# Patient Record
Sex: MALE
[De-identification: names, ages, dates, MRNs, and addresses within clinical notes are randomized per-mention and may not be internally consistent; named-entity substitution may affect disease eponyms.]

---

## 2019-05-09 PROBLEM — Z00.00 ENCOUNTER FOR PREVENTIVE HEALTH EXAMINATION: Status: ACTIVE | Noted: 2019-05-09

## 2019-06-10 ENCOUNTER — APPOINTMENT (OUTPATIENT)
Dept: NEUROLOGY | Facility: CLINIC | Age: 34
End: 2019-06-10
Payer: MEDICAID

## 2019-06-10 VITALS
WEIGHT: 223 LBS | SYSTOLIC BLOOD PRESSURE: 128 MMHG | BODY MASS INDEX: 29.55 KG/M2 | DIASTOLIC BLOOD PRESSURE: 80 MMHG | HEART RATE: 60 BPM | HEIGHT: 73 IN

## 2019-06-10 PROCEDURE — 99205 OFFICE O/P NEW HI 60 MIN: CPT

## 2019-06-10 NOTE — HISTORY OF PRESENT ILLNESS
[FreeTextEntry1] : This is a 33-year-old left-handed male who presents with chief complaints of tremors\par \par The patient states that since November 2018 he has noticed tremors in his right index finger and now he feels that it has progressed to involve both hands and his torso. This is more on the right upper extremity than the left. while sleeping he feels that there is some tremor in his body. He does endorse having anxiety. He denies any family history of tremor. Does not take coffee. Has occasional alcohol which does not change the tremor.\par \par He denies any changes in his handwriting the tremor does not interfere with any daily activities. He denies any slowness. No change in his walking or balance. His sense of smell is good. He denies constipation or urinary issues. His memory is not so good and sometimes he is forgetful but again does not interfere with any daily activities. He describes his mood as being mostly and she is sometimes depressed. He sometimes feels muscle twitching at different parts of his body. He\par \par He had a recent MRI of the brain done in December 2018 at [facility] which he states was  normal other than a cyst behind his eye. He also had MRI of the cervical and lumbar spine.\par \par He has tried Xanax , propranolol and trazodone, none of them helped and have been discontinued.\par \par Past medical history: Anxiety\par Family history unremarkable\par \par Review of systems: A complete review of systems is performed and is negative except as listed in history of present illness

## 2019-06-10 NOTE — PHYSICAL EXAM
[General Appearance - In No Acute Distress] : in no acute distress [General Appearance - Alert] : alert [Affect] : the affect was normal [Oriented To Time, Place, And Person] : oriented to person, place, and time [Impaired Insight] : insight and judgment were intact [Person] : oriented to person [Time] : oriented to time [Place] : oriented to place [Concentration Intact] : normal concentrating ability [Naming Objects] : no difficulty naming common objects [Visual Intact] : visual attention was ~T not ~L decreased [Writing A Sentence] : no difficulty writing a sentence [Repeating Phrases] : no difficulty repeating a phrase [Fluency] : fluency intact [Comprehension] : comprehension intact [Reading] : reading intact [Past History] : adequate knowledge of personal past history [Cranial Nerves Optic (II)] : visual acuity intact bilaterally,  visual fields full to confrontation, pupils equal round and reactive to light [Cranial Nerves Oculomotor (III)] : extraocular motion intact [Cranial Nerves Trigeminal (V)] : facial sensation intact symmetrically [Cranial Nerves Facial (VII)] : face symmetrical [Cranial Nerves Vestibulocochlear (VIII)] : hearing was intact bilaterally [Cranial Nerves Glossopharyngeal (IX)] : tongue and palate midline [Cranial Nerves Accessory (XI - Cranial And Spinal)] : head turning and shoulder shrug symmetric [Cranial Nerves Hypoglossal (XII)] : there was no tongue deviation with protrusion [Motor Tone] : muscle tone was normal in all four extremities [No Muscle Atrophy] : normal bulk in all four extremities [Motor Strength] : muscle strength was normal in all four extremities [Sensation Tactile Decrease] : light touch was intact [Past-pointing] : there was no past-pointing [Balance] : balance was intact [Tremor] : no tremor present [2+] : Ankle jerk left 2+ [Plantar Reflex Right Only] : normal on the right [FreeTextEntry1] : No resting action or postural tremor is seen. Patient has mild tremulousness. No dysmetria. No dysdiadochokinesia. Heel-to-shin is intact. No muscle twitching is seen. Tongue and uvula are midline and symmetric with no unusual movements. He is able to tandem walk. Good speed and stride of walking. spiral normal. Handwriting normal [Plantar Reflex Left Only] : normal on the left [PERRL With Normal Accommodation] : pupils were equal in size, round, reactive to light, with normal accommodation [] : no respiratory distress [Hearing Threshold Finger Rub Not [place]] : hearing was normal [Edema] : there was no peripheral edema [Abdomen Soft] : soft [No Spinal Tenderness] : no spinal tenderness [Skin Turgor] : normal skin turgor [Abnormal Walk] : normal gait

## 2019-06-10 NOTE — DISCUSSION/SUMMARY
[FreeTextEntry1] : This is a 33-year-old left-handed male who presents with chief complaints of shakiness. On exam he is noted to have mild tremulousness which is unchanged with rest or posture holding. 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 Patient has been on this before and did not notice any benefit. He will retry for another week if there is no benefit we'll increase it to twice a day. Patient does stop how to monitor his pulses his pulses below 60 he will give me a call. Other side effects such as lightheadedness, dizziness, drop in blood pressure and depression fatigue were discussed with him\par Followup in one month

## 2019-06-24 ENCOUNTER — APPOINTMENT (OUTPATIENT)
Dept: NEUROLOGY | Facility: CLINIC | Age: 34
End: 2019-06-24
Payer: MEDICAID

## 2019-06-24 VITALS
DIASTOLIC BLOOD PRESSURE: 75 MMHG | BODY MASS INDEX: 28.1 KG/M2 | HEIGHT: 73 IN | SYSTOLIC BLOOD PRESSURE: 110 MMHG | WEIGHT: 212 LBS | HEART RATE: 69 BPM

## 2019-06-24 PROCEDURE — 99214 OFFICE O/P EST MOD 30 MIN: CPT

## 2019-06-24 NOTE — PHYSICAL EXAM
[General Appearance - Alert] : alert [Oriented To Time, Place, And Person] : oriented to person, place, and time [General Appearance - In No Acute Distress] : in no acute distress [Impaired Insight] : insight and judgment were intact [Affect] : the affect was normal [Person] : oriented to person [Place] : oriented to place [Time] : oriented to time [Concentration Intact] : normal concentrating ability [Visual Intact] : visual attention was ~T not ~L decreased [Naming Objects] : no difficulty naming common objects [Writing A Sentence] : no difficulty writing a sentence [Repeating Phrases] : no difficulty repeating a phrase [Fluency] : fluency intact [Comprehension] : comprehension intact [Reading] : reading intact [Past History] : adequate knowledge of personal past history [Cranial Nerves Optic (II)] : visual acuity intact bilaterally,  visual fields full to confrontation, pupils equal round and reactive to light [Cranial Nerves Oculomotor (III)] : extraocular motion intact [Cranial Nerves Trigeminal (V)] : facial sensation intact symmetrically [Cranial Nerves Facial (VII)] : face symmetrical [Cranial Nerves Vestibulocochlear (VIII)] : hearing was intact bilaterally [Cranial Nerves Glossopharyngeal (IX)] : tongue and palate midline [Cranial Nerves Accessory (XI - Cranial And Spinal)] : head turning and shoulder shrug symmetric [Cranial Nerves Hypoglossal (XII)] : there was no tongue deviation with protrusion [Motor Tone] : muscle tone was normal in all four extremities [Motor Strength] : muscle strength was normal in all four extremities [No Muscle Atrophy] : normal bulk in all four extremities [Sensation Tactile Decrease] : light touch was intact [Balance] : balance was intact [Past-pointing] : there was no past-pointing [Tremor] : no tremor present [2+] : Ankle jerk left 2+ [Plantar Reflex Right Only] : normal on the right [Plantar Reflex Left Only] : normal on the left [FreeTextEntry1] : No resting action or postural tremor is seen. Patient has mild tremulousness. No dysmetria. No dysdiadochokinesia. Heel-to-shin is intact. No muscle twitching is seen. Tongue and uvula are midline and symmetric with no unusual movements. He is able to tandem walk. Good speed and stride of walking. Patient lay down flat on the bed to demonstrate head trauma. There was slight tremulousness. No posturing no dystonic features. [PERRL With Normal Accommodation] : pupils were equal in size, round, reactive to light, with normal accommodation [Hearing Threshold Finger Rub Not [place]] : hearing was normal [] : no respiratory distress [Edema] : there was no peripheral edema [Abdomen Soft] : soft [No Spinal Tenderness] : no spinal tenderness [Abnormal Walk] : normal gait [Skin Turgor] : normal skin turgor

## 2019-06-24 NOTE — HISTORY OF PRESENT ILLNESS
[FreeTextEntry1] : This is a 33-year-old left-handed male who presents with chief complaints of tremors\par \par The patient states that since November 2018 he has noticed tremors in his right index finger and now he feels that it has progressed to involve both hands and his torso. This is more on the right upper extremity than the left. while sleeping he feels that there is some tremor in his body. He does endorse having anxiety. He denies any family history of tremor. Does not take coffee. Has occasional alcohol which does not change the tremor.\par \par He denies any changes in his handwriting the tremor does not interfere with any daily activities. He denies any slowness. No change in his walking or balance. His sense of smell is good. He denies constipation or urinary issues. His memory is not so good and sometimes he is forgetful but again does not interfere with any daily activities. He describes his mood as being mostly and she is sometimes depressed. He sometimes feels muscle twitching at different parts of his body. He\par \par He had a recent MRI of the brain done in December 2018 at [facility] which he states was  normal other than a cyst behind his eye. He also had MRI of the cervical and lumbar spine.\par \par He has tried Xanax , propranolol and trazodone, none of them helped and have been discontinued.\par \par Past medical history: Anxiety\par Family history unremarkable\par \par Review of systems: A complete review of systems is performed and is negative except as listed in history of present illness\par \par Interval history: The patient states he is unchanged he was seen about 2 weeks ago. He has not tried propranolol and had not done the blood work yet he is extremely heard about the tremors in his hands and also in his head. He does endorse having significant anxiety and depression. The tremors interfere most when he is holding things such as his bone. It does not interfere with eating writing or drinking. No falls. reports muscle aches and joint pains

## 2019-07-03 ENCOUNTER — APPOINTMENT (OUTPATIENT)
Dept: NEUROLOGY | Facility: CLINIC | Age: 34
End: 2019-07-03

## 2019-07-25 ENCOUNTER — APPOINTMENT (OUTPATIENT)
Dept: NEUROLOGY | Facility: CLINIC | Age: 34
End: 2019-07-25

## 2019-09-30 ENCOUNTER — APPOINTMENT (OUTPATIENT)
Dept: NEUROLOGY | Facility: CLINIC | Age: 34
End: 2019-09-30
Payer: MEDICAID

## 2019-09-30 VITALS
HEART RATE: 62 BPM | DIASTOLIC BLOOD PRESSURE: 84 MMHG | WEIGHT: 220 LBS | SYSTOLIC BLOOD PRESSURE: 139 MMHG | BODY MASS INDEX: 29.16 KG/M2 | HEIGHT: 73 IN

## 2019-09-30 PROCEDURE — 99214 OFFICE O/P EST MOD 30 MIN: CPT

## 2019-09-30 NOTE — DISCUSSION/SUMMARY
[FreeTextEntry1] : This is a 34-year-old left-handed male who presents with chief complaints of shakiness. On exam he is noted to have mild tremulousness which is unchanged with rest or posture holding. 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 Patient has been on this before and did not notice any benefit. tried only once a day. He will retry for another week if there is no benefit we'll increase it to twice a day. Other side effects such as lightheadedness, dizziness, drop in blood pressure and depression fatigue were discussed with him\par Followup in 2 weeks, per his request\par advised f/u with psychiatry for anxiety.

## 2019-09-30 NOTE — PHYSICAL EXAM
[General Appearance - In No Acute Distress] : in no acute distress [General Appearance - Alert] : alert [Impaired Insight] : insight and judgment were intact [Oriented To Time, Place, And Person] : oriented to person, place, and time [Affect] : the affect was normal [Person] : oriented to person [Time] : oriented to time [Place] : oriented to place [Concentration Intact] : normal concentrating ability [Visual Intact] : visual attention was ~T not ~L decreased [Repeating Phrases] : no difficulty repeating a phrase [Naming Objects] : no difficulty naming common objects [Comprehension] : comprehension intact [Writing A Sentence] : no difficulty writing a sentence [Fluency] : fluency intact [Reading] : reading intact [Past History] : adequate knowledge of personal past history [Cranial Nerves Oculomotor (III)] : extraocular motion intact [Cranial Nerves Optic (II)] : visual acuity intact bilaterally,  visual fields full to confrontation, pupils equal round and reactive to light [Cranial Nerves Trigeminal (V)] : facial sensation intact symmetrically [Cranial Nerves Facial (VII)] : face symmetrical [Cranial Nerves Vestibulocochlear (VIII)] : hearing was intact bilaterally [Cranial Nerves Glossopharyngeal (IX)] : tongue and palate midline [Cranial Nerves Accessory (XI - Cranial And Spinal)] : head turning and shoulder shrug symmetric [Cranial Nerves Hypoglossal (XII)] : there was no tongue deviation with protrusion [No Muscle Atrophy] : normal bulk in all four extremities [Motor Strength] : muscle strength was normal in all four extremities [Motor Tone] : muscle tone was normal in all four extremities [Sensation Tactile Decrease] : light touch was intact [Balance] : balance was intact [Past-pointing] : there was no past-pointing [Tremor] : no tremor present [2+] : Ankle jerk right 2+ [Plantar Reflex Right Only] : normal on the right [Plantar Reflex Left Only] : normal on the left [FreeTextEntry1] : No resting action or postural tremor is seen. Patient has mild tremulousness at rest and with posture holding R>L. No dysmetria. No dysdiadochokinesia. Heel-to-shin is intact. No muscle twitching is seen. Tongue and uvula are midline and symmetric with no unusual movements. He is able to tandem walk. Good speed and stride of walking. Patient lay down flat on the bed to demonstrate head tremor. There was slight tremulousness with the phone on his forehead. No posturing no dystonic features. [PERRL With Normal Accommodation] : pupils were equal in size, round, reactive to light, with normal accommodation [Hearing Threshold Finger Rub Not [place]] : hearing was normal [Edema] : there was no peripheral edema [] : no respiratory distress [Abdomen Soft] : soft [No Spinal Tenderness] : no spinal tenderness [Abnormal Walk] : normal gait [Skin Turgor] : normal skin turgor

## 2019-09-30 NOTE — HISTORY OF PRESENT ILLNESS
[FreeTextEntry1] : This is a 33-year-old left-handed male who presents with chief complaints of tremors\par \par The patient states that since November 2018 he has noticed tremors in his right index finger and now he feels that it has progressed to involve both hands and his torso. This is more on the right upper extremity than the left. while sleeping he feels that there is some tremor in his body. He does endorse having anxiety. He denies any family history of tremor. Does not take coffee. Has occasional alcohol which does not change the tremor.\par \par He denies any changes in his handwriting the tremor does not interfere with any daily activities. He denies any slowness. No change in his walking or balance. His sense of smell is good. He denies constipation or urinary issues. His memory is not so good and sometimes he is forgetful but again does not interfere with any daily activities. He describes his mood as being mostly and she is sometimes depressed. He sometimes feels muscle twitching at different parts of his body. He\par \par He had a recent MRI of the brain done in December 2018 at [facility] which he states was  normal other than a cyst behind his eye. He also had MRI of the cervical and lumbar spine.\par \par He has tried Xanax , propranolol and trazodone, none of them helped and have been discontinued.\par \par Past medical history: Anxiety\par Family history unremarkable\par \par Review of systems: A complete review of systems is performed and is negative except as listed in history of present illness\par \par Interval history: The patient states he is unchanged he tried propranolol once a day for a week, no change.  past w/u is still pending.  .he is extremely worried  about the tremors in his hands and also in his head. He does endorse having significant anxiety and depression. The tremors interfere most when he is holding things such as his phone. It does not interfere with eating writing or drinking. No falls. reports muscle aches and joint pains. no anosmia, slowness.

## 2019-10-21 ENCOUNTER — APPOINTMENT (OUTPATIENT)
Dept: NEUROLOGY | Facility: CLINIC | Age: 34
End: 2019-10-21

## 2019-11-22 ENCOUNTER — APPOINTMENT (OUTPATIENT)
Dept: NEUROLOGY | Facility: CLINIC | Age: 34
End: 2019-11-22
Payer: MEDICAID

## 2019-11-22 VITALS
HEIGHT: 73 IN | WEIGHT: 225 LBS | HEART RATE: 58 BPM | BODY MASS INDEX: 29.82 KG/M2 | DIASTOLIC BLOOD PRESSURE: 76 MMHG | SYSTOLIC BLOOD PRESSURE: 122 MMHG

## 2019-11-22 DIAGNOSIS — Z78.9 OTHER SPECIFIED HEALTH STATUS: ICD-10-CM

## 2019-11-22 PROCEDURE — 99215 OFFICE O/P EST HI 40 MIN: CPT

## 2019-11-22 RX ORDER — PROPRANOLOL HYDROCHLORIDE 80 MG/1
80 CAPSULE, EXTENDED RELEASE ORAL
Qty: 180 | Refills: 5 | Status: DISCONTINUED | COMMUNITY
Start: 2019-06-10 | End: 2019-11-22

## 2019-11-22 NOTE — PHYSICAL EXAM
[General Appearance - Alert] : alert [General Appearance - In No Acute Distress] : in no acute distress [Oriented To Time, Place, And Person] : oriented to person, place, and time [Impaired Insight] : insight and judgment were intact [Person] : oriented to person [Place] : oriented to place [Time] : oriented to time [Concentration Intact] : normal concentrating ability [Visual Intact] : visual attention was ~T not ~L decreased [Naming Objects] : no difficulty naming common objects [Repeating Phrases] : no difficulty repeating a phrase [Writing A Sentence] : no difficulty writing a sentence [Fluency] : fluency intact [Comprehension] : comprehension intact [Reading] : reading intact [Past History] : adequate knowledge of personal past history [Cranial Nerves Optic (II)] : visual acuity intact bilaterally,  visual fields full to confrontation, pupils equal round and reactive to light [Cranial Nerves Oculomotor (III)] : extraocular motion intact [Cranial Nerves Trigeminal (V)] : facial sensation intact symmetrically [Cranial Nerves Facial (VII)] : face symmetrical [Cranial Nerves Vestibulocochlear (VIII)] : hearing was intact bilaterally [Cranial Nerves Glossopharyngeal (IX)] : tongue and palate midline [Cranial Nerves Accessory (XI - Cranial And Spinal)] : head turning and shoulder shrug symmetric [Cranial Nerves Hypoglossal (XII)] : there was no tongue deviation with protrusion [Motor Tone] : muscle tone was normal in all four extremities [Motor Strength] : muscle strength was normal in all four extremities [No Muscle Atrophy] : normal bulk in all four extremities [Sensation Tactile Decrease] : light touch was intact [Balance] : balance was intact [2+] : Ankle jerk left 2+ [PERRL With Normal Accommodation] : pupils were equal in size, round, reactive to light, with normal accommodation [Hearing Threshold Finger Rub Not [place]] : hearing was normal [] : no respiratory distress [Edema] : there was no peripheral edema [Abdomen Soft] : soft [No Spinal Tenderness] : no spinal tenderness [Abnormal Walk] : normal gait [Skin Turgor] : normal skin turgor [Motor Handedness Left-Handed] : the patient is left hand dominant [Past-pointing] : there was no past-pointing [Tremor] : no tremor present [Dysdiadochokinesia Bilaterally] : not present [Coordination - Dysmetria Impaired Finger-to-Nose Bilateral] : not present [Plantar Reflex Right Only] : normal on the right [Plantar Reflex Left Only] : normal on the left [FreeTextEntry1] : Facial expression and volume of speech were normal.  EOM were full with normal saccades, smooth pursuits, and no square wave jerks seen.  Tone was normal at neck and bilateral upper and lower extremities.  Rapid alternating movements were normal in amplitude and speed at bilateral upper extremities.  Foot tap was normal bilaterally.  Able to get out of chair without using arms.\par Gait was normal-based and steady with good heel to toe stride.  Tandem gait was normal.  Pull test was negative. \par He had a minimal postural tremor of the 2nd and 4th finger of the right hand, and a minimal head tremor when lying down. He had no tremor during writing or spiral drawing, and no rest or intention tremor. \par \par \par \par

## 2019-11-22 NOTE — DISCUSSION/SUMMARY
[FreeTextEntry1] : This is a 34-year-old left-handed male who presents with chief complaints of shakiness. On exam he is noted to have mild tremulousness, mostly in the 2nd and 4th finger on the right. 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 Will obtain some of the previously suggested blood tests. His anxiety is worsening his tremor (and makes him more focused on it), I advised that he discuss with his pschiatrist if he needs more than xanax.  I will see him again in 4 months, sooner if new problems arise.\par \par

## 2019-11-22 NOTE — HISTORY OF PRESENT ILLNESS
[FreeTextEntry1] : 34-year-old left-handed male who presents with chief complaints of tremors. \par \par The patient states that since November 2018 he has noticed tremors in his right index finger and now he feels that it has progressed to involve both hands and his torso. This is more on the right upper extremity than the left. He does endorse having anxiety. He denies any family history of tremor. Does not take coffee. He has occasional alcohol which does not change the tremor. Nothing he does improves the tremor, anxiety makes it worse.\par \par Tried propranolol, did not work, stopped it. Takes xanax prn. Handwriting unchanged. The tremor does not interfere with any daily activities. He denies any slowness. No change in his walking, but feels balance is "weak". has not fallen His sense of smell is good. He denies constipation or urinary issues. Sometimes he is forgetful but again does not interfere with any daily activities. He describes his mood as being mostly anxious and he is sometimes depressed. Also feels he has twitches all over his body.\par \par He had a recent MRI of the brain done in December 2018 at [facility] which he states was  normal other than a cyst behind his eye. He also had MRI of the cervical and lumbar spine. Per him, TSH was ok. \par \par

## 2020-04-29 ENCOUNTER — APPOINTMENT (OUTPATIENT)
Dept: NEUROLOGY | Facility: CLINIC | Age: 35
End: 2020-04-29
Payer: MEDICAID

## 2020-04-29 DIAGNOSIS — G25.2 OTHER SPECIFIED FORMS OF TREMOR: ICD-10-CM

## 2020-04-29 PROCEDURE — 99214 OFFICE O/P EST MOD 30 MIN: CPT | Mod: 95

## 2020-04-29 NOTE — PHYSICAL EXAM
[Person] : oriented to person [Place] : oriented to place [Time] : oriented to time [Writing A Sentence] : no difficulty writing a sentence [Cranial Nerves Oculomotor (III)] : extraocular motion intact [Cranial Nerves Facial (VII)] : face symmetrical [Cranial Nerves Vestibulocochlear (VIII)] : hearing was intact bilaterally [Cranial Nerves Accessory (XI - Cranial And Spinal)] : head turning and shoulder shrug symmetric [Cranial Nerves Hypoglossal (XII)] : there was no tongue deviation with protrusion [Motor Strength] : muscle strength was normal in all four extremities [Motor Tone] : muscle tone was normal in all four extremities [No Muscle Atrophy] : normal bulk in all four extremities [Motor Handedness Left-Handed] : the patient is left hand dominant [Abnormal Walk] : normal gait [Fluency] : fluency intact [Comprehension] : comprehension intact [Past-pointing] : there was no past-pointing [Tremor] : no tremor present [Dysdiadochokinesia Bilaterally] : not present [Coordination - Dysmetria Impaired Finger-to-Nose Bilateral] : not present [FreeTextEntry1] : Facial expression and volume of speech were normal.  EOM were full with normal saccades, smooth pursuits, and no square wave jerks seen.  Foot tap was normal bilaterally.  Able to get out of chair without using arms.\par Gait was normal-based and steady with good heel to toe stride.\par He had a minimal postural tremor of the 2nd and 4th finger of the right hand, and a minimal head tremor when lying down. He had no tremor during writing or spiral drawing, and no rest or intention tremor. \par \par \par \par

## 2020-04-29 NOTE — DISCUSSION/SUMMARY
[FreeTextEntry1] : The patient is a 34-year-old left-handed male with a chief complaints of shakiness. On exam he is noted to have mild tremulousness, mostly in the 2nd and 4th finger on the right. 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 Will obtain some of the previously suggested blood tests. His anxiety is worsening his tremor (and makes him more focused on it), but he does not want treatment.\par The exam looked about the same today. Will repeat some of the blood tests, as we never saw them and he is progressing. I will also see him in the office in 3-4 months,\par \par We discussed the above impression, plan and recommendations during the visit. Counseling represented more then 50% of the 30 minute visit time\par

## 2020-04-29 NOTE — HISTORY OF PRESENT ILLNESS
[Home] : at home, [unfilled] , at the time of the visit. [Patient] : the patient [Other Location: e.g. Home (Enter Location, City,State)___] : at [unfilled] [FreeTextEntry1] : The patient is a [id number] left-handed male who presents with chief complaints of tremors. \par \par Since November 2018 he has noticed tremors in his right index finger and now he feels that it has progressed to involve both hands and his torso. This is more on the right upper extremity than the left. He does endorse having anxiety. He denies any family history of tremor. Does not take coffee. He has occasional alcohol which does not change the tremor. Nothing he does improves the tremor, anxiety makes it worse.\par \par Tried propranolol, did not work, stopped it. Takes xanax prn. Handwriting unchanged. The tremor does not interfere with any daily activities. He denies any slowness. No change in his walking, but feels balance is "weak". has not fallen His sense of smell is good. He denies constipation or urinary issues. Sometimes he is forgetful but again does not interfere with any daily activities. He describes his mood as being mostly anxious and he is sometimes depressed. Also feels he has twitches all over his body.\par \par He had a recent MRI of the brain done in December 2018 at [facility] which he states was  normal other than a cyst behind his eye. He also had MRI of the cervical and lumbar spine. Per him, TSH was ok. \par \par Last seen in the office on 11/22/2019\par 1. Finger tremor is the same, xanax did not help.\par 2. Feels some tremor in his right leg, but not visible\par 3. Feels more anxious, but not enough to want treatment,\par 4. No weakness, numbness, balance issues.\par \par

## 2020-05-13 ENCOUNTER — APPOINTMENT (OUTPATIENT)
Dept: NEUROLOGY | Facility: CLINIC | Age: 35
End: 2020-05-13

## 2021-09-07 ENCOUNTER — APPOINTMENT (OUTPATIENT)
Dept: NEUROLOGY | Facility: CLINIC | Age: 36
End: 2021-09-07
Payer: MEDICAID

## 2021-09-07 DIAGNOSIS — R25.1 TREMOR, UNSPECIFIED: ICD-10-CM

## 2021-09-07 DIAGNOSIS — R29.898 OTHER SYMPTOMS AND SIGNS INVOLVING THE MUSCULOSKELETAL SYSTEM: ICD-10-CM

## 2021-09-07 PROCEDURE — 99214 OFFICE O/P EST MOD 30 MIN: CPT

## 2021-09-07 PROCEDURE — 99072 ADDL SUPL MATRL&STAF TM PHE: CPT

## 2021-09-07 NOTE — PHYSICAL EXAM
[General Appearance - In No Acute Distress] : in no acute distress [General Appearance - Well Nourished] : well nourished [] : normal voice and communication [General Appearance - Well-Appearing] : healthy appearing [Affect] : the affect was normal [Fluency] : fluency intact [Cranial Nerves Optic (II)] : visual acuity intact bilaterally,  visual fields full to confrontation, pupils equal round and reactive to light [Cranial Nerves Oculomotor (III)] : extraocular motion intact [Cranial Nerves Facial (VII)] : face symmetrical [Cranial Nerves Vestibulocochlear (VIII)] : hearing was intact bilaterally [Cranial Nerves Glossopharyngeal (IX)] : tongue and palate midline [Cranial Nerves Accessory (XI - Cranial And Spinal)] : head turning and shoulder shrug symmetric [Cranial Nerves Hypoglossal (XII)] : there was no tongue deviation with protrusion [Motor Tone] : muscle tone was normal in all four extremities [Motor Strength] : muscle strength was normal in all four extremities [Involuntary Movements] : no involuntary movements were seen [No Muscle Atrophy] : normal bulk in all four extremities [Motor Handedness Left-Handed] : the patient is left hand dominant [Abnormal Walk] : normal gait [Balance] : balance was intact [Tremor] : a tremor present [2+] : Ankle jerk left 2+ [Paresis Pronator Drift Right-Sided] : no pronator drift on the right [Motor Strength Upper Extremities Bilaterally] : strength was normal in both upper extremities [Paresis Pronator Drift Left-Sided] : no pronator drift on the left [Motor Strength Lower Extremities Bilaterally] : strength was normal in both lower extremities [Past-pointing] : there was no past-pointing [Coordination - Dysmetria Impaired Finger-to-Nose Bilateral] : not present

## 2021-09-07 NOTE — DISCUSSION/SUMMARY
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 \par \par In light of new symptoms, will repeat the following studies: \par [] MRI head and C-spine w/o contrast\par [] EMG study \par [] labs: TSH, B12 and folate, copper and zinc serum\par \par Return to clinic pending work-up results. \par \par We discussed the above impression, plan and recommendations during the visit. Counseling represented more then 50% of the 30 minute visit time\par

## 2021-09-07 NOTE — HISTORY OF PRESENT ILLNESS
[FreeTextEntry1] : 37 yo LH gentleman diagnosed with enhanced physiologic tremor here for follow-up and today with new symptoms. Last seen via telehealth April 2019 and tremor of 2nd and 4th digits on R hand was isolated symptom. \par When symptoms started in 2018, TFT's were normal. MRI brain, C-spine and L-spine were also normal (had incidental cyst behind one eye). \par \par - Today he continues to have R 2nd digit tremor at rest, but also feels R 4th finger and ulnar palm feel shaky. L hand can intermittently shake for a few days then that resolves. \par - Occasionally has L leg weakness, last time was 1 week ago while at the gym, was unable to continue running due to weakness. Duration of symptoms was a few days. Less often feels weakness of R leg. No morning paralysis or weakness after large carbohydrate meals, but does feel sugar intake worsens the tremor. \par - In the past few days experienced electric shock sensation in bilateral hands in the late evening. \par - Complains of muscle twitches, specifically of the legs, lately-- subtle but occurs at rest. No restless legs at night. \par - Of note, also having erectile dysfunction lately. Has seen urology and prescribed a medication to help. \par - Mood is poor at times and feels the above symptoms worsen on bad mood days. \par \par \par

## 2021-09-14 ENCOUNTER — APPOINTMENT (OUTPATIENT)
Dept: NEUROLOGY | Facility: CLINIC | Age: 36
End: 2021-09-14

## 2022-02-18 ENCOUNTER — APPOINTMENT (OUTPATIENT)
Dept: NEUROLOGY | Facility: CLINIC | Age: 37
End: 2022-02-18

## 2024-11-29 NOTE — DISCUSSION/SUMMARY
Subjective  Korey Kearney, 36 y.o. male presents today with:  Chief Complaint   Patient presents with    Medication Check     Follow up        History of Present Illness  The patient presents for evaluation of multiple medical concerns.    He reports experiencing intermittent anxiety, which he attributes to recent stressors. He is currently managing this with propranolol, taken in the evening, which he finds effective. He also mentions that lifestyle modifications have been beneficial. He reports no chest pain, palpitations, or dizziness, except when he is extremely fatigued. He has experienced some ED since starting propranolol.     He is on atorvastatin for cholesterol management and reports no muscle aches or joint pains associated with this medication.    His job involves physical labor, which leaves him feeling sore and tired at the end of the day. He has noticed an increase in fatigue over the past year but is unsure if this is due to his work. He reports no increase in muscle aches or joint pains.    He declines the influenza vaccine.    He reports good sleep quality and normal bowel and bladder function, with no issues with his urinary stream or pain. He reports no leg swelling.    :Labs that he had done at work:  Total chol 114 HDL 42 LDL 37 Trig 173 Glucose 97    Past Medical History:   Diagnosis Date    Hyperlipidemia 4/10/2020     History reviewed. No pertinent surgical history.  Current Outpatient Medications   Medication Sig Dispense Refill    propranolol (INDERAL LA) 60 MG extended release capsule TAKE 1 CAPSULE BY MOUTH EVERY NIGHT 90 capsule 3    atorvastatin (LIPITOR) 10 MG tablet Take 1 tablet by mouth daily 90 tablet 3    tadalafil (CIALIS) 20 MG tablet Take 1 tablet by mouth daily as needed for Erectile Dysfunction 30 tablet 5    omeprazole (PRILOSEC) 40 MG delayed release capsule TAKE 1 CAPSULE BY MOUTH EVERY MORNING BEFORE BREAKFAST 90 capsule 0    azithromycin (ZITHROMAX) 250 MG tablet Take  [FreeTextEntry1] : This is a 33-year-old left-handed male who presents with chief complaints of shakiness. On exam he is noted to have mild tremulousness which is unchanged with rest or posture holding. 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 Patient has been on this before and did not notice any benefit. He will retry for another week if there is no benefit we'll increase it to twice a day. Other side effects such as lightheadedness, dizziness, drop in blood pressure and depression fatigue were discussed with him\par Followup in 2 months